# Patient Record
Sex: FEMALE | Race: WHITE | NOT HISPANIC OR LATINO
[De-identification: names, ages, dates, MRNs, and addresses within clinical notes are randomized per-mention and may not be internally consistent; named-entity substitution may affect disease eponyms.]

---

## 2021-06-25 ENCOUNTER — TRANSCRIPTION ENCOUNTER (OUTPATIENT)
Age: 51
End: 2021-06-25

## 2023-01-04 ENCOUNTER — APPOINTMENT (OUTPATIENT)
Dept: GASTROENTEROLOGY | Facility: CLINIC | Age: 53
End: 2023-01-04
Payer: COMMERCIAL

## 2023-01-04 VITALS — HEIGHT: 63 IN | HEART RATE: 78 BPM | WEIGHT: 165 LBS | BODY MASS INDEX: 29.23 KG/M2

## 2023-01-04 DIAGNOSIS — K59.09 OTHER CONSTIPATION: ICD-10-CM

## 2023-01-04 DIAGNOSIS — Z91.89 OTHER SPECIFIED PERSONAL RISK FACTORS, NOT ELSEWHERE CLASSIFIED: ICD-10-CM

## 2023-01-04 DIAGNOSIS — Z12.11 ENCOUNTER FOR SCREENING FOR MALIGNANT NEOPLASM OF COLON: ICD-10-CM

## 2023-01-04 DIAGNOSIS — R14.0 ABDOMINAL DISTENSION (GASEOUS): ICD-10-CM

## 2023-01-04 PROCEDURE — 99204 OFFICE O/P NEW MOD 45 MIN: CPT

## 2023-01-04 NOTE — ASSESSMENT
[FreeTextEntry1] : Mahsa Is a very pleasant 52-year-old female without any major medical problems who is referred for a screening colonoscopy.  She has hypertension and dyslipidemia both well controlled with medication.  We reviewed the screening tests for colon cancer prevention. We discussed screening tests such as stool test, CT scans such as CT colonography, and a colonoscopy. Patient was made aware that despite these screening test, a cancerous lesion can still be missed. The gold standard test is a screening colonoscopy.\par \par I will therefore plan for a colonoscopy to rule out colon polyps, colorectal cancer etc. under monitored anesthesia care. Risks such as perforation requiring surgery, bleeding, infection, diverticulitis, colitis, missed colon cancer (2% to 6%), internal organ injury, etc, risks of bowel prep including colitis, syncope, adverse reaction to medication etc. and risks of anesthesia including cardiopulmonary compromise were discussed with patient. Patient verbalized understanding and agrees to proceed with the planned procedure.\par \par Instruction provided for confirmation on appropriate her home regarding bowel.\par \par Reviewed and reconciled medications, allergies, PMHx, PSHx, SocHx, FMHx. Reviewed imaging, blood work, diagnostic testing, discussed with patient. Further recommendations pending results of above work up and evaluation.\par

## 2023-01-04 NOTE — PHYSICAL EXAM
[Alert] : alert [Normal Voice/Communication] : normal voice/communication [Healthy Appearing] : healthy appearing [No Acute Distress] : no acute distress [Sclera] : the sclera and conjunctiva were normal [Hearing Threshold Finger Rub Not [place]] : hearing was normal [Normal Lips/Gums] : the lips and gums were normal [Oropharynx] : the oropharynx was normal [Normal Appearance] : the appearance of the neck was normal [No Neck Mass] : no neck mass was observed [No Respiratory Distress] : no respiratory distress [No Acc Muscle Use] : no accessory muscle use [Respiration, Rhythm And Depth] : normal respiratory rhythm and effort [Auscultation Breath Sounds / Voice Sounds] : lungs were clear to auscultation bilaterally [Heart Rate And Rhythm] : heart rate was normal and rhythm regular [Normal S1, S2] : normal S1 and S2 [Murmurs] : no murmurs [Bowel Sounds] : normal bowel sounds [Abdomen Tenderness] : non-tender [No Masses] : no abdominal mass palpated [Abdomen Soft] : soft [] : no hepatosplenomegaly [Normal Affect] : the affect was normal

## 2023-01-04 NOTE — HISTORY OF PRESENT ILLNESS
[FreeTextEntry1] : Mahsa Is a very pleasant 52-year-old female without any major medical problems who is referred for a screening colonoscopy.  She has hypertension and dyslipidemia both well controlled with medication.  Also with insomnia and anxiety, stable dose of Lexapro and as needed benzo.\par \par I had a long discuss with the patient regarding colon cancer in the [place]. \par \par We reviewed risk factors for colon cancer which include age, ethnicity, having comorbidities such as obesity, DM, cardiac disease, having nicotine addiction, alcohol addiction, having a family hx of colon cancer, cancer syndromes, personal hx of inflammatory disease etc. \par \par Patient is above average risk for colon cancer due to family history . \par \par She has had chronic constipation for which she takes a variety of over-the-counter medication with good relief.  She continues to have cyclical constipation preceding her menstrual period.  She has PMS for which she takes NSAIDs as well.  She will try to avoid prior to colonoscopy.  Denies upper GI symptoms\par \par

## 2023-01-04 NOTE — CONSULT LETTER
[Dear  ___] : Dear  [unfilled], [Consult Letter:] : I had the pleasure of evaluating your patient, [unfilled]. [Consult Closing:] : Thank you very much for allowing me to participate in the care of this patient.  If you have any questions, please do not hesitate to contact me. [Sincerely,] : Sincerely, [FreeTextEntry1] : Mahsa Is a very pleasant 52-year-old female without any major medical problems who is referred for a screening colonoscopy.  She has hypertension and dyslipidemia both well controlled with medication.  We reviewed the screening tests for colon cancer prevention. We discussed screening tests such as stool test, CT scans such as CT colonography, and a colonoscopy. Patient was made aware that despite these screening test, a cancerous lesion can still be missed. The gold standard test is a screening colonoscopy.\par \par I will therefore plan for a colonoscopy to rule out colon polyps, colorectal cancer etc. under monitored anesthesia care. Risks such as perforation requiring surgery, bleeding, infection, diverticulitis, colitis, missed colon cancer (2% to 6%), internal organ injury, etc, risks of bowel prep including colitis, syncope, adverse reaction to medication etc. and risks of anesthesia including cardiopulmonary compromise were discussed with patient. Patient verbalized understanding and agrees to proceed with the planned procedure.\par \par Instruction provided for confirmation on appropriate her home regarding bowel.\par \par Reviewed and reconciled medications, allergies, PMHx, PSHx, SocHx, FMHx. Reviewed imaging, blood work, diagnostic testing, discussed with patient. Further recommendations pending results of above work up and evaluation. [FreeTextEntry3] : Janine Vega MD\par Gastroenterology, Hepatology and Motility\par

## 2023-01-04 NOTE — REASON FOR VISIT
[Initial Evaluation] : an initial evaluation [FreeTextEntry1] : Need for colorectal cancer screening due to increased risk, second-degree relative with colorectal cancer and FDR with pancreatic and breast cancer (mother)

## 2023-02-21 ENCOUNTER — APPOINTMENT (OUTPATIENT)
Dept: GASTROENTEROLOGY | Facility: AMBULATORY MEDICAL SERVICES | Age: 53
End: 2023-02-21
Payer: COMMERCIAL

## 2023-02-21 ENCOUNTER — RESULT REVIEW (OUTPATIENT)
Age: 53
End: 2023-02-21

## 2023-02-21 PROCEDURE — 45380 COLONOSCOPY AND BIOPSY: CPT
